# Patient Record
Sex: FEMALE | Race: WHITE | NOT HISPANIC OR LATINO | Employment: OTHER | URBAN - METROPOLITAN AREA
[De-identification: names, ages, dates, MRNs, and addresses within clinical notes are randomized per-mention and may not be internally consistent; named-entity substitution may affect disease eponyms.]

---

## 2017-09-26 ENCOUNTER — ALLSCRIPTS OFFICE VISIT (OUTPATIENT)
Dept: OTHER | Facility: OTHER | Age: 32
End: 2017-09-26

## 2017-09-26 DIAGNOSIS — M76.61 ACHILLES TENDINITIS OF RIGHT LOWER EXTREMITY: ICD-10-CM

## 2017-10-03 ENCOUNTER — GENERIC CONVERSION - ENCOUNTER (OUTPATIENT)
Dept: OTHER | Facility: OTHER | Age: 32
End: 2017-10-03

## 2017-10-03 ENCOUNTER — APPOINTMENT (OUTPATIENT)
Dept: PHYSICAL THERAPY | Facility: MEDICAL CENTER | Age: 32
End: 2017-10-03
Payer: COMMERCIAL

## 2017-10-03 DIAGNOSIS — M76.61 ACHILLES TENDINITIS OF RIGHT LOWER EXTREMITY: ICD-10-CM

## 2017-10-03 PROCEDURE — G8991 OTHER PT/OT GOAL STATUS: HCPCS

## 2017-10-03 PROCEDURE — G8990 OTHER PT/OT CURRENT STATUS: HCPCS

## 2017-10-03 PROCEDURE — 97161 PT EVAL LOW COMPLEX 20 MIN: CPT

## 2017-10-03 PROCEDURE — 97140 MANUAL THERAPY 1/> REGIONS: CPT

## 2017-10-10 ENCOUNTER — APPOINTMENT (OUTPATIENT)
Dept: PHYSICAL THERAPY | Facility: MEDICAL CENTER | Age: 32
End: 2017-10-10
Payer: COMMERCIAL

## 2017-10-10 PROCEDURE — 97140 MANUAL THERAPY 1/> REGIONS: CPT

## 2017-10-10 PROCEDURE — 97112 NEUROMUSCULAR REEDUCATION: CPT

## 2017-10-10 PROCEDURE — 97110 THERAPEUTIC EXERCISES: CPT

## 2017-10-17 ENCOUNTER — APPOINTMENT (OUTPATIENT)
Dept: PHYSICAL THERAPY | Facility: MEDICAL CENTER | Age: 32
End: 2017-10-17
Payer: COMMERCIAL

## 2017-10-17 PROCEDURE — 97110 THERAPEUTIC EXERCISES: CPT

## 2017-10-17 PROCEDURE — 97112 NEUROMUSCULAR REEDUCATION: CPT

## 2017-10-17 PROCEDURE — 97140 MANUAL THERAPY 1/> REGIONS: CPT

## 2017-10-19 ENCOUNTER — GENERIC CONVERSION - ENCOUNTER (OUTPATIENT)
Dept: OTHER | Facility: OTHER | Age: 32
End: 2017-10-19

## 2017-10-24 ENCOUNTER — APPOINTMENT (OUTPATIENT)
Dept: PHYSICAL THERAPY | Facility: MEDICAL CENTER | Age: 32
End: 2017-10-24
Payer: COMMERCIAL

## 2017-10-24 PROCEDURE — 97140 MANUAL THERAPY 1/> REGIONS: CPT

## 2017-10-24 PROCEDURE — 97112 NEUROMUSCULAR REEDUCATION: CPT

## 2017-10-24 PROCEDURE — 97110 THERAPEUTIC EXERCISES: CPT

## 2017-10-27 NOTE — PROGRESS NOTES
Assessment  1  Achilles tendinitis of right lower extremity (950 52) (M76 61)    Plan  Achilles tendinitis of right lower extremity    · *1 - [facility]  *  Status: Active  Requested for: 51FFB3377  Care Summary provided  : Yes   · Orthopedic Surgery Referral Other Co-Management  *Chancy Meter  Status: Hold For -  Scheduling  Requested for: [id number]  are Referring to a non-[facility] Preferred Provider : Established Patient  Care Summary provided  : Yes    Discussion/Summary    #1, R Achilles tendinitis- pt wishes to see Ortho Marybel Rosales as she is an established pt  Also refer to PT  Ice and decreased exercise however patient is currently training for marathon  I would also encourage patient to take her anti-inflammatory on a more regular basis as a prescription strength utilizing her Advil that would be 40 mg 1 tab 3 times daily with meals  Chief Complaint  c/o heal pain in right foot  mid back pain  Pt  states she injured it at the gym today  kck      History of Present Illness  HPI: Patient here today because she is currently training for a marathon that is 6 weeks away this fall with the [facility]  This would be her 15 year son  She states that in the past she has had issues with right Achilles tendinitis and at that time was in college and had worked out by the  there  She states that now for the past 2 and half weeks she has had similar symptoms return with right Achilles pain swelling and crunching while doing certain exercises  She does realize that this is an overuse injury however she is bound and determined to complete this marathon training  She also while at the gym today did acutely injure her mid back and has some muscle tightness  She states that she takes key murky and glucosamine chondroitin on a regular basis and as needed uses Advil  She has seen orthopedics in the past with Dr Kavita carreon with [facility]   She is here today to obtain a referral to go back to him so he he may address the Achilles issue so that she may per severe with her goals  Review of Systems    Constitutional: No fever, no chills, feels well, no tiredness, no recent weight gain or loss  ENT: no ear ache, no loss of hearing, no nosebleeds or nasal discharge, no sore throat or hoarseness  Cardiovascular: no complaints of slow or fast heart rate, no chest pain, no palpitations, no leg claudication or lower extremity edema  Respiratory: no complaints of shortness of breath, no wheezing, no dyspnea on exertion, no orthopnea or PND  Breasts: no complaints of breast pain, breast lump or nipple discharge  Gastrointestinal: no complaints of abdominal pain, no constipation, no nausea or diarrhea, no vomiting, no bloody stools  Genitourinary: no complaints of dysuria, no incontinence, no pelvic pain, no dysmenorrhea, no vaginal discharge or abnormal vaginal bleeding  Musculoskeletal: no complaints of arthralgia, no myalgia, no joint swelling or stiffness, no limb pain or swelling-- and-- as noted in HPI  Integumentary: no complaints of skin rash or lesion, no itching or dry skin, no skin wounds  Neurological: no complaints of headache, no confusion, no numbness or tingling, no dizziness or fainting  ROS reviewed  Active Problems  1  Ankle pain, left (719 47) (M25 572)   2  Anxiety (300 00) (F41 9)   3  Cervical cancer screening (V76 2) (Z12 4)   4  Encounter for screening for vascular disease (V81 2) (Z13 6)   5  Fatigue (780 79) (R53 83)   6  Knee pain, right (719 46) (M25 561)    Past Medical History  1  History of gastroenteritis (V12 79) (Z87 19)  Active Problems And Past Medical History Reviewed: The active problems and past medical history were reviewed and updated today  Family History  Mother    1  Family history of hypertension (V17 49) (Z82 49)  Father    2  Family history of Diabetes Mellitus (V18 0)  Maternal Grandmother    3   Family history of lung cancer (V16 1) (Z80 1)  Maternal Grandfather    4  Family history of Cancer, colon  Paternal Grandfather    11  Family history of diabetes mellitus (V18 0) (Z83 3)  Family History Reviewed: The family history was reviewed and updated today  Social History   · Being A Social Drinker   · Denied: History of Drug Use   · Employed   · Never a smoker   · No secondhand smoke exposure (V49 89) (Z78 9)   · Single  The social history was reviewed and updated today  Surgical History  1  History of Breast Surgery Enlargement Procedure   2  History of Breast Surgery Enlargement Procedure Elective   3  History of Liposuction Abdomen  Surgical History Reviewed: The surgical history was reviewed and updated today  Current Meds   1  Aviane 0 1-20 MG-MCG Oral Tablet; Take 1 tablet daily as directed; Therapy: 23TQL1293 to (Evaluate:58Ben0807) Recorded   2  Iron 325 (65 Fe) MG Oral Tablet; Therapy: 38FON5377 to Recorded   3  Multi Vitamin Daily Oral Tablet; Therapy: 59MWZ9183 to Recorded   4  Naproxen 375 MG Oral Tablet; TAKE 1 TABLET TWICE DAILY AFTER MEALS; Therapy: 81QPU7902 to (Last Rx:01Nov2016)  Requested for: 44RAN4440 Ordered   5  Omega 3 1000 MG Oral Capsule; Therapy: 30XYI9757 to Recorded   6  Potassium 99 MG Oral Tablet; Therapy: 94CVJ6713 to Recorded   7  Turmeric Curcumin Oral Capsule; Therapy: 96CXG9726 to Recorded    The medication list was reviewed and updated today  Allergies  1  No Known Drug Allergies    Vitals   Recorded: 27JBW2024 03:34PM   Heart Rate 64   Systolic 725, LUE, Sitting   Diastolic 70, LUE, Sitting   Height 5 ft 2 in   Weight 116 lb    BMI Calculated 21 22   BSA Calculated 1 52     Physical Exam    Constitutional   General appearance: No acute distress, well appearing and well nourished  Eyes   Conjunctiva and lids: No swelling, erythema or discharge      Ears, Nose, Mouth, and Throat   External inspection of ears and nose: Normal     Pulmonary Respiratory effort: No increased work of breathing or signs of respiratory distress  Musculoskeletal   Gait and station: Normal     Inspection/palpation of joints, bones, and muscles: Abnormal  -- Right Achilles with mild crepitus mild edema and tenderness to palpation  Full range of motion to right foot with flexion and extension     Psychiatric   Mood and affect: Normal          Signatures   Electronically signed by : Gini Dance, [facility]; Sep 26 2017  4:15PM EST                       (Author)    Electronically signed by : Daysi Hatch MD; Sep 26 2017  4:30PM EST                       (Author)

## 2017-10-31 ENCOUNTER — APPOINTMENT (OUTPATIENT)
Dept: PHYSICAL THERAPY | Facility: MEDICAL CENTER | Age: 32
End: 2017-10-31
Payer: COMMERCIAL

## 2017-10-31 PROCEDURE — 97110 THERAPEUTIC EXERCISES: CPT

## 2017-10-31 PROCEDURE — 97140 MANUAL THERAPY 1/> REGIONS: CPT

## 2017-11-02 ENCOUNTER — GENERIC CONVERSION - ENCOUNTER (OUTPATIENT)
Dept: OTHER | Facility: OTHER | Age: 32
End: 2017-11-02

## 2017-11-07 ENCOUNTER — APPOINTMENT (OUTPATIENT)
Dept: PHYSICAL THERAPY | Facility: MEDICAL CENTER | Age: 32
End: 2017-11-07
Payer: COMMERCIAL

## 2017-11-07 PROCEDURE — G8992 OTHER PT/OT  D/C STATUS: HCPCS

## 2017-11-07 PROCEDURE — G8991 OTHER PT/OT GOAL STATUS: HCPCS

## 2017-11-07 PROCEDURE — 97140 MANUAL THERAPY 1/> REGIONS: CPT

## 2017-11-28 ENCOUNTER — GENERIC CONVERSION - ENCOUNTER (OUTPATIENT)
Dept: FAMILY MEDICINE CLINIC | Facility: CLINIC | Age: 32
End: 2017-11-28

## 2018-01-10 NOTE — RESULT NOTES
Verified Results  * XR ANKLE 3+ VIEW LEFT [id number] 01:20PM Yaneth Barajas Order Number: [id number]     Test Name Result Flag Reference   XR ANKLE 3+ VW LEFT (Report)     LEFT ANKLE     INDICATION: Posttraumatic Left ankle pain  COMPARISON: None     VIEWS: 3; 3 images     FINDINGS:     There is no acute fracture or dislocation  No degenerative changes  No lytic or blastic lesions are seen  Soft tissues are unremarkable  IMPRESSION:     No acute osseous abnormality         Workstation performed: [id number]     Signed by:   Brant Mendez MD   11/2/16

## 2018-01-13 VITALS
DIASTOLIC BLOOD PRESSURE: 70 MMHG | HEIGHT: 62 IN | WEIGHT: 116 LBS | HEART RATE: 64 BPM | SYSTOLIC BLOOD PRESSURE: 128 MMHG | BODY MASS INDEX: 21.35 KG/M2

## 2018-04-12 PROCEDURE — 87624 HPV HI-RISK TYP POOLED RSLT: CPT | Performed by: OBSTETRICS & GYNECOLOGY

## 2018-04-12 PROCEDURE — G0145 SCR C/V CYTO,THINLAYER,RESCR: HCPCS | Performed by: OBSTETRICS & GYNECOLOGY

## 2018-04-13 ENCOUNTER — LAB REQUISITION (OUTPATIENT)
Dept: LAB | Facility: HOSPITAL | Age: 33
End: 2018-04-13
Payer: COMMERCIAL

## 2018-04-13 DIAGNOSIS — Z01.419 ENCOUNTER FOR GYNECOLOGICAL EXAMINATION WITHOUT ABNORMAL FINDING: ICD-10-CM

## 2018-04-13 DIAGNOSIS — Z11.51 ENCOUNTER FOR SCREENING FOR HUMAN PAPILLOMAVIRUS (HPV): ICD-10-CM

## 2018-04-18 LAB
HPV RRNA GENITAL QL NAA+PROBE: NORMAL
LAB AP GYN PRIMARY INTERPRETATION: NORMAL
Lab: NORMAL

## 2019-03-28 ENCOUNTER — TELEPHONE (OUTPATIENT)
Dept: GYNECOLOGY | Facility: CLINIC | Age: 34
End: 2019-03-28

## 2019-04-29 ENCOUNTER — OFFICE VISIT (OUTPATIENT)
Dept: GYNECOLOGY | Facility: CLINIC | Age: 34
End: 2019-04-29
Payer: COMMERCIAL

## 2019-04-29 VITALS
SYSTOLIC BLOOD PRESSURE: 110 MMHG | DIASTOLIC BLOOD PRESSURE: 62 MMHG | WEIGHT: 119 LBS | HEIGHT: 62 IN | RESPIRATION RATE: 17 BRPM | HEART RATE: 64 BPM | BODY MASS INDEX: 21.9 KG/M2 | TEMPERATURE: 96.4 F

## 2019-04-29 DIAGNOSIS — Z01.419 ENCOUNTER FOR GYNECOLOGICAL EXAMINATION (GENERAL) (ROUTINE) WITHOUT ABNORMAL FINDINGS: Primary | ICD-10-CM

## 2019-04-29 DIAGNOSIS — Z30.41 SURVEILLANCE OF CONTRACEPTIVE PILL: ICD-10-CM

## 2019-04-29 PROCEDURE — 99395 PREV VISIT EST AGE 18-39: CPT | Performed by: OBSTETRICS & GYNECOLOGY

## 2019-06-03 ENCOUNTER — TELEPHONE (OUTPATIENT)
Dept: FAMILY MEDICINE CLINIC | Facility: CLINIC | Age: 34
End: 2019-06-03

## 2019-06-03 DIAGNOSIS — Z30.41 SURVEILLANCE OF CONTRACEPTIVE PILL: ICD-10-CM

## 2020-01-21 ENCOUNTER — TELEPHONE (OUTPATIENT)
Dept: GYNECOLOGY | Facility: CLINIC | Age: 35
End: 2020-01-21

## 2020-03-24 ENCOUNTER — ANNUAL EXAM (OUTPATIENT)
Dept: GYNECOLOGY | Facility: CLINIC | Age: 35
End: 2020-03-24
Payer: COMMERCIAL

## 2020-03-24 VITALS
HEIGHT: 63 IN | BODY MASS INDEX: 21.48 KG/M2 | WEIGHT: 121.2 LBS | SYSTOLIC BLOOD PRESSURE: 132 MMHG | DIASTOLIC BLOOD PRESSURE: 76 MMHG

## 2020-03-24 DIAGNOSIS — Z01.419 ENCOUNTER FOR GYNECOLOGICAL EXAMINATION (GENERAL) (ROUTINE) WITHOUT ABNORMAL FINDINGS: Primary | ICD-10-CM

## 2020-03-24 DIAGNOSIS — Z12.4 ENCOUNTER FOR PAPANICOLAOU SMEAR FOR CERVICAL CANCER SCREENING: ICD-10-CM

## 2020-03-24 DIAGNOSIS — Z30.41 ENCOUNTER FOR SURVEILLANCE OF CONTRACEPTIVE PILLS: ICD-10-CM

## 2020-03-24 PROCEDURE — G0145 SCR C/V CYTO,THINLAYER,RESCR: HCPCS | Performed by: OBSTETRICS & GYNECOLOGY

## 2020-03-24 PROCEDURE — 99395 PREV VISIT EST AGE 18-39: CPT | Performed by: OBSTETRICS & GYNECOLOGY

## 2020-03-24 PROCEDURE — 87624 HPV HI-RISK TYP POOLED RSLT: CPT | Performed by: OBSTETRICS & GYNECOLOGY

## 2020-03-24 RX ORDER — NORETHINDRONE ACETATE AND ETHINYL ESTRADIOL AND FERROUS FUMARATE 1MG-20(24)
1 KIT ORAL DAILY
Qty: 84 TABLET | Refills: 3 | Status: SHIPPED | OUTPATIENT
Start: 2020-03-24 | End: 2020-04-21

## 2020-03-24 NOTE — PATIENT INSTRUCTIONS
Normal findings on routine gyn exam  Advised monthly SBE, annual CBE and baseline screening mammography at age 34  Reviewed ASCCP guidelines and recommended pap with cotesting q3 yrs for this low risk patient  Pap done today secondary to no ECC on last pap  STI testing was offered and the patient declines; she reports low risk  The patient desires to continue oral contraceptives, but will change  Diet/activity recommendations - Calcium 7350-5780 mg daily and Vit D 600-100 IU daily  RTO in one year or sooner PRN

## 2020-03-24 NOTE — PROGRESS NOTES
Assessment/Plan:  Normal findings on routine gyn exam  Advised monthly SBE, annual CBE and baseline screening mammography at age 34  Reviewed ASCCP guidelines and recommended pap with cotesting q3 yrs for this low risk patient  Pap done today secondary to no ECC on last pap  STI testing was offered and the patient declines; she reports low risk  The patient desires to continue oral contraceptives, but will change  Diet/activity recommendations - Calcium 6534-6322 mg daily and Vit D 600-100 IU daily  RTO in one year or sooner PRN  Diagnoses and all orders for this visit:    Encounter for gynecological examination (general) (routine) without abnormal findings    Encounter for surveillance of contraceptive pills  -     norethindrone-ethinyl estradiol-ferrous fumarate (LOESTIN 24 FE) 1-20 MG-MCG(24) per tablet; Take 1 tablet by mouth daily for 28 days        Subjective:      Patient ID: Theodora Smith is a 38 y o  female  This patient presents for routine annual gyn exam    She denies acute gyn complaints  Menses are regular and absent on Lo Loestrin  She denies pelvic pain, dyspareunia, breast concerns  She has B/L breast implants  She denies abnormal discharge, bowel/bladder dysfunction, depression/anxiety  Pap/HPV testing up to date and normal, 2018, but no ECC  Will repeat today  She is sexually active with same partner for 7 years with whom she lives, monogamous  She does not have children and is not planning on having children  She denies STI concerns  Lo Loestrin for contraception, however insurance is no longer covering this OCP  The following portions of the patient's history were reviewed and updated as appropriate: allergies, current medications, past family history, past medical history, past social history, past surgical history and problem list     Review of Systems   Constitutional: Negative  HENT: Negative  Respiratory: Negative  Cardiovascular: Negative  Gastrointestinal: Negative  Endocrine: Negative  Genitourinary: Negative for difficulty urinating, dyspareunia, dysuria, frequency, menstrual problem, pelvic pain, urgency, vaginal bleeding, vaginal discharge and vaginal pain  Musculoskeletal: Negative  Skin: Negative  Neurological: Negative  Psychiatric/Behavioral: Negative  Objective:      /76 (BP Location: Right arm)   Ht 5' 2 75" (1 594 m)   Wt 55 kg (121 lb 3 2 oz)   BMI 21 64 kg/m²          Physical Exam   Constitutional: She is oriented to person, place, and time  She appears well-developed and well-nourished  She is cooperative  HENT:   Head: Normocephalic and atraumatic  Neck: Normal range of motion  Neck supple  No thyroid mass and no thyromegaly present  Cardiovascular: Normal rate, regular rhythm and normal heart sounds  Pulmonary/Chest: Effort normal and breath sounds normal  Right breast exhibits no inverted nipple, no mass, no nipple discharge, no skin change and no tenderness  Left breast exhibits no inverted nipple, no mass, no nipple discharge, no skin change and no tenderness  No breast tenderness or discharge  Breasts are symmetrical    B/L breast augmentation noted   Abdominal: Soft  Normal appearance and bowel sounds are normal  There is no hepatosplenomegaly  There is no tenderness  Hernia confirmed negative in the right inguinal area and confirmed negative in the left inguinal area  Genitourinary: Vagina normal and uterus normal  Rectal exam shows no external hemorrhoid  No breast tenderness or discharge  Pelvic exam was performed with patient supine  No labial fusion  There is no rash, tenderness, lesion or injury on the right labia  There is no rash, tenderness, lesion or injury on the left labia  Uterus is not deviated, not enlarged, not fixed and not tender  Cervix exhibits no motion tenderness, no discharge and no friability  Right adnexum displays no mass, no tenderness and no fullness  Left adnexum displays no mass, no tenderness and no fullness  No erythema, tenderness or bleeding in the vagina  No signs of injury around the vagina  No vaginal discharge found  Genitourinary Comments: Urethra normal without lesions  No bladder tenderness   Musculoskeletal: Normal range of motion  Lymphadenopathy:     She has no axillary adenopathy  No inguinal adenopathy noted on the right or left side  Right: No inguinal adenopathy present  Left: No inguinal adenopathy present  Neurological: She is alert and oriented to person, place, and time  Skin: Skin is warm, dry and intact  Psychiatric: She has a normal mood and affect  Her speech is normal and behavior is normal  Cognition and memory are normal    Nursing note and vitals reviewed

## 2020-03-26 LAB
HPV HR 12 DNA CVX QL NAA+PROBE: NEGATIVE
HPV16 DNA CVX QL NAA+PROBE: NEGATIVE
HPV18 DNA CVX QL NAA+PROBE: NEGATIVE

## 2020-03-27 LAB
LAB AP GYN PRIMARY INTERPRETATION: NORMAL
Lab: NORMAL